# Patient Record
Sex: MALE | Race: WHITE | NOT HISPANIC OR LATINO | Employment: FULL TIME | URBAN - METROPOLITAN AREA
[De-identification: names, ages, dates, MRNs, and addresses within clinical notes are randomized per-mention and may not be internally consistent; named-entity substitution may affect disease eponyms.]

---

## 2021-01-09 ENCOUNTER — APPOINTMENT (OUTPATIENT)
Dept: GENERAL RADIOLOGY | Facility: HOSPITAL | Age: 26
End: 2021-01-09

## 2021-01-09 ENCOUNTER — HOSPITAL ENCOUNTER (EMERGENCY)
Facility: HOSPITAL | Age: 26
Discharge: HOME | End: 2021-01-09
Attending: EMERGENCY MEDICINE | Admitting: EMERGENCY MEDICINE

## 2021-01-09 VITALS
BODY MASS INDEX: 22.73 KG/M2 | RESPIRATION RATE: 18 BRPM | HEIGHT: 68 IN | SYSTOLIC BLOOD PRESSURE: 104 MMHG | HEART RATE: 117 BPM | DIASTOLIC BLOOD PRESSURE: 82 MMHG | OXYGEN SATURATION: 94 % | TEMPERATURE: 98.6 F | WEIGHT: 150 LBS

## 2021-01-09 DIAGNOSIS — J45.31 ASTHMA IN ADULT, MILD PERSISTENT, WITH ACUTE EXACERBATION: Primary | ICD-10-CM

## 2021-01-09 LAB
ALBUMIN SERPL-MCNC: 4.2 G/DL (ref 3.5–5.2)
ALBUMIN/GLOB SERPL: 1.4 G/DL
ALP SERPL-CCNC: 104 U/L (ref 39–117)
ALT SERPL W P-5'-P-CCNC: 12 U/L (ref 1–41)
ANION GAP SERPL CALCULATED.3IONS-SCNC: 7.4 MMOL/L (ref 5–15)
AST SERPL-CCNC: 19 U/L (ref 1–40)
BASOPHILS # BLD AUTO: 0.07 10*3/MM3 (ref 0–0.2)
BASOPHILS NFR BLD AUTO: 0.6 % (ref 0–1.5)
BILIRUB SERPL-MCNC: 0.7 MG/DL (ref 0–1.2)
BUN SERPL-MCNC: 12 MG/DL (ref 6–20)
BUN/CREAT SERPL: 15.8 (ref 7–25)
CALCIUM SPEC-SCNC: 9.1 MG/DL (ref 8.6–10.5)
CHLORIDE SERPL-SCNC: 96 MMOL/L (ref 98–107)
CO2 SERPL-SCNC: 30.6 MMOL/L (ref 22–29)
CREAT SERPL-MCNC: 0.76 MG/DL (ref 0.76–1.27)
DEPRECATED RDW RBC AUTO: 39.8 FL (ref 37–54)
EOSINOPHIL # BLD AUTO: 2.08 10*3/MM3 (ref 0–0.4)
EOSINOPHIL NFR BLD AUTO: 16.8 % (ref 0.3–6.2)
ERYTHROCYTE [DISTWIDTH] IN BLOOD BY AUTOMATED COUNT: 11.8 % (ref 12.3–15.4)
GFR SERPL CREATININE-BSD FRML MDRD: 126 ML/MIN/1.73
GLOBULIN UR ELPH-MCNC: 3 GM/DL
GLUCOSE SERPL-MCNC: 116 MG/DL (ref 65–99)
HCT VFR BLD AUTO: 43.1 % (ref 37.5–51)
HGB BLD-MCNC: 14.5 G/DL (ref 13–17.7)
IMM GRANULOCYTES # BLD AUTO: 0.05 10*3/MM3 (ref 0–0.05)
IMM GRANULOCYTES NFR BLD AUTO: 0.4 % (ref 0–0.5)
LYMPHOCYTES # BLD AUTO: 1.98 10*3/MM3 (ref 0.7–3.1)
LYMPHOCYTES NFR BLD AUTO: 16 % (ref 19.6–45.3)
MCH RBC QN AUTO: 30.7 PG (ref 26.6–33)
MCHC RBC AUTO-ENTMCNC: 33.6 G/DL (ref 31.5–35.7)
MCV RBC AUTO: 91.1 FL (ref 79–97)
MONOCYTES # BLD AUTO: 0.93 10*3/MM3 (ref 0.1–0.9)
MONOCYTES NFR BLD AUTO: 7.5 % (ref 5–12)
NEUTROPHILS NFR BLD AUTO: 58.7 % (ref 42.7–76)
NEUTROPHILS NFR BLD AUTO: 7.27 10*3/MM3 (ref 1.7–7)
NRBC BLD AUTO-RTO: 0 /100 WBC (ref 0–0.2)
PLATELET # BLD AUTO: 324 10*3/MM3 (ref 140–450)
PMV BLD AUTO: 9.5 FL (ref 6–12)
POTASSIUM SERPL-SCNC: 4.6 MMOL/L (ref 3.5–5.2)
PROT SERPL-MCNC: 7.2 G/DL (ref 6–8.5)
RBC # BLD AUTO: 4.73 10*6/MM3 (ref 4.14–5.8)
SODIUM SERPL-SCNC: 134 MMOL/L (ref 136–145)
WBC # BLD AUTO: 12.38 10*3/MM3 (ref 3.4–10.8)

## 2021-01-09 PROCEDURE — 99283 EMERGENCY DEPT VISIT LOW MDM: CPT

## 2021-01-09 PROCEDURE — 99284 EMERGENCY DEPT VISIT MOD MDM: CPT | Performed by: EMERGENCY MEDICINE

## 2021-01-09 PROCEDURE — 85025 COMPLETE CBC W/AUTO DIFF WBC: CPT | Performed by: EMERGENCY MEDICINE

## 2021-01-09 PROCEDURE — 94799 UNLISTED PULMONARY SVC/PX: CPT

## 2021-01-09 PROCEDURE — 25010000002 METHYLPREDNISOLONE PER 125 MG: Performed by: EMERGENCY MEDICINE

## 2021-01-09 PROCEDURE — 71045 X-RAY EXAM CHEST 1 VIEW: CPT

## 2021-01-09 PROCEDURE — 94640 AIRWAY INHALATION TREATMENT: CPT

## 2021-01-09 PROCEDURE — 96374 THER/PROPH/DIAG INJ IV PUSH: CPT

## 2021-01-09 PROCEDURE — 80053 COMPREHEN METABOLIC PANEL: CPT | Performed by: EMERGENCY MEDICINE

## 2021-01-09 RX ORDER — ALBUTEROL SULFATE 2.5 MG/3ML
2.5 SOLUTION RESPIRATORY (INHALATION)
Status: COMPLETED | OUTPATIENT
Start: 2021-01-09 | End: 2021-01-09

## 2021-01-09 RX ORDER — SODIUM CHLORIDE 0.9 % (FLUSH) 0.9 %
10 SYRINGE (ML) INJECTION AS NEEDED
Status: DISCONTINUED | OUTPATIENT
Start: 2021-01-09 | End: 2021-01-09 | Stop reason: HOSPADM

## 2021-01-09 RX ORDER — METHYLPREDNISOLONE 4 MG/1
TABLET ORAL
Qty: 21 TABLET | Refills: 0 | Status: SHIPPED | OUTPATIENT
Start: 2021-01-09

## 2021-01-09 RX ORDER — ALBUTEROL SULFATE 90 UG/1
2 AEROSOL, METERED RESPIRATORY (INHALATION) EVERY 4 HOURS PRN
Qty: 8 G | Refills: 0 | Status: SHIPPED | OUTPATIENT
Start: 2021-01-09 | End: 2021-01-23

## 2021-01-09 RX ORDER — METHYLPREDNISOLONE SODIUM SUCCINATE 125 MG/2ML
125 INJECTION, POWDER, LYOPHILIZED, FOR SOLUTION INTRAMUSCULAR; INTRAVENOUS ONCE
Status: COMPLETED | OUTPATIENT
Start: 2021-01-09 | End: 2021-01-09

## 2021-01-09 RX ORDER — ALBUTEROL SULFATE 90 UG/1
2 AEROSOL, METERED RESPIRATORY (INHALATION) EVERY 4 HOURS PRN
Status: DISCONTINUED | OUTPATIENT
Start: 2021-01-09 | End: 2021-01-09 | Stop reason: HOSPADM

## 2021-01-09 RX ADMIN — ALBUTEROL SULFATE 2.5 MG: 2.5 SOLUTION RESPIRATORY (INHALATION) at 20:02

## 2021-01-09 RX ADMIN — METHYLPREDNISOLONE SODIUM SUCCINATE 125 MG: 125 INJECTION, POWDER, FOR SOLUTION INTRAMUSCULAR; INTRAVENOUS at 19:42

## 2021-01-09 RX ADMIN — ALBUTEROL SULFATE 2.5 MG: 2.5 SOLUTION RESPIRATORY (INHALATION) at 19:42

## 2021-01-09 RX ADMIN — ALBUTEROL SULFATE 2.5 MG: 2.5 SOLUTION RESPIRATORY (INHALATION) at 19:38

## 2021-01-10 NOTE — ED PROVIDER NOTES
"Subjective     History provided by:  Patient    History of Present Illness    · Chief complaint: Shortness of breath and wheezing    · Location: Lungs    · Quality/Severity: Severe shortness of breath with wheezing.    · Timing/Onset: Started 5 days ago.    · Modifying Factors: Any exertion exacerbates his shortness of breath.    · Associated symptoms: He states he does not have a regular cough, but states he does try to purposely cough sometimes to improve his breathing.  Denies a fever, headache, change in taste or smell.    · Narrative: The patient is a 25-year-old white male complaining of a 5-day history of shortness of breath and wheezing.  He denies a previous history of asthma or wheezing.  He denies any exposures to noxious fumes.  He is a non-smoker.  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  He does not have a PCP.    Review of Systems   Constitutional: Negative for activity change, appetite change, chills, diaphoresis, fatigue and fever.   HENT: Negative for congestion, ear pain, rhinorrhea, sinus pressure, sinus pain, sore throat, trouble swallowing and voice change.    Eyes: Negative for pain and visual disturbance.   Respiratory: Positive for shortness of breath and wheezing. Negative for cough.    Cardiovascular: Negative for chest pain.   Gastrointestinal: Negative for abdominal pain, diarrhea, nausea and vomiting.   Genitourinary: Negative for difficulty urinating.   Musculoskeletal: Negative for back pain, myalgias, neck pain and neck stiffness.   Skin: Negative for color change and rash.   Neurological: Negative for dizziness, weakness, numbness and headaches.   Psychiatric/Behavioral: Negative for confusion.     History reviewed. No pertinent past medical history.  /82   Pulse 117   Temp 98.6 °F (37 °C) (Oral)   Resp 18   Ht 172.7 cm (68\")   Wt 68 kg (150 lb)   SpO2 94%   BMI 22.81 kg/m²     History reviewed. No pertinent past medical history.  Labs Reviewed   COMPREHENSIVE METABOLIC PANEL " "- Abnormal; Notable for the following components:       Result Value    Glucose 116 (*)     Sodium 134 (*)     Chloride 96 (*)     CO2 30.6 (*)     All other components within normal limits    Narrative:     GFR Normal >60  Chronic Kidney Disease <60  Kidney Failure <15     CBC WITH AUTO DIFFERENTIAL - Abnormal; Notable for the following components:    WBC 12.38 (*)     RDW 11.8 (*)     Lymphocyte % 16.0 (*)     Eosinophil % 16.8 (*)     Neutrophils, Absolute 7.27 (*)     Monocytes, Absolute 0.93 (*)     Eosinophils, Absolute 2.08 (*)     All other components within normal limits   CBC AND DIFFERENTIAL    Narrative:     The following orders were created for panel order CBC & Differential.  Procedure                               Abnormality         Status                     ---------                               -----------         ------                     CBC Auto Differential[[id number]]        Abnormal            Final result                 Please view results for these tests on the individual orders.       Allergies   Allergen Reactions   • Sulfuric Acid Other (See Comments)     \"I was just told that a long time ago with eye drops\"        History reviewed. No pertinent surgical history.    History reviewed. No pertinent family history.    Social History     Socioeconomic History   • Marital status: Single     Spouse name: Not on file   • Number of children: Not on file   • Years of education: Not on file   • Highest education level: Not on file   Tobacco Use   • Smoking status: Never Smoker   Substance and Sexual Activity   • Alcohol use: Never     Frequency: Never   • Drug use: Never           Objective   Physical Exam  Vitals signs and nursing note reviewed.   Constitutional:       General: He is in acute distress.      Appearance: He is well-developed. He is not ill-appearing, toxic-appearing or diaphoretic.      Comments: The patient appears in acute respiratory distress.  He does not appear toxic.  " Review of his vital signs: He is afebrile with a temperature 98.6, tachypneic with a respiratory rate of 28 with a normal room air oxygen saturation of 98%, tachycardic with a heart rate of 108, blood pressure slightly elevated 147/90.   HENT:      Head: Normocephalic and atraumatic.   Eyes:      Extraocular Movements: Extraocular movements intact.      Pupils: Pupils are equal, round, and reactive to light.   Neck:      Musculoskeletal: Normal range of motion and neck supple.   Cardiovascular:      Rate and Rhythm: Regular rhythm. Tachycardia present.      Heart sounds: No murmur.   Pulmonary:      Effort: Tachypnea present.      Breath sounds: Examination of the right-upper field reveals decreased breath sounds and wheezing. Examination of the left-upper field reveals decreased breath sounds and wheezing. Examination of the right-middle field reveals decreased breath sounds and wheezing. Examination of the left-middle field reveals decreased breath sounds and wheezing. Examination of the right-lower field reveals decreased breath sounds and wheezing. Examination of the left-lower field reveals decreased breath sounds and wheezing. Decreased breath sounds and wheezing present. No rhonchi or rales.   Chest:      Chest wall: No mass.   Abdominal:      General: Bowel sounds are normal.      Palpations: Abdomen is soft.      Tenderness: There is no abdominal tenderness. There is no guarding.   Musculoskeletal: Normal range of motion.      Right lower leg: He exhibits no tenderness. No edema.      Left lower leg: He exhibits no tenderness. No edema.   Skin:     General: Skin is warm and dry.      Capillary Refill: Capillary refill takes less than 2 seconds.      Coloration: Skin is not cyanotic.      Findings: No erythema or rash.   Neurological:      General: No focal deficit present.      Mental Status: He is alert and oriented to person, place, and time.      Cranial Nerves: No cranial nerve deficit.      Motor: No  weakness.   Psychiatric:         Mood and Affect: Mood is anxious.         Procedures           ED Course  ED Course as of Jan 09 2251   Sat Jan 09, 2021 2055 The patient's chest x-ray showed no acute infiltrate.  The radiologist noted an abnormal fullness of the hilum bilaterally and a possible upper lobe nodule.    [TP]   2056 Review the patient's laboratory studies: His CBC had an elevated white count of 12 with a normal differential.  CMP was essentially normal.    [TP]   2056 The patient had moderate respiratory distress on arrival with full phase expiratory wheezing and end inspiratory phase wheezing and decreased air movement.  He was tachypneic with a respiratory rate of 28 and a normal oxygen saturation of 98% on room air.  He was administered 3 albuterol nebulizer treatments and Solu-Medrol 125 mg IV.    [TP]   2105 The patient was examined at 20:20 and noticed to have improved air movement with clearing of the inspiratory phase and persistent expiratory wheezing.  The patient subjectively stated his dyspnea was much better.    [TP]   2106 Reexamination at 21:00 the patient had significant improvement in his dyspnea with a room air oxygen saturation of 95% with persistent expiratory wheezing with good air movement.  The patient request to go home.    [TP]   2250 The patient was discharged with prescriptions for an albuterol inhaler and a Medrol Dosepak.  He was referred to follow-up with Dr. Chisholm.    [TP]      ED Course User Index  [TP] Osman Webb MD                                           MDM  Number of Diagnoses or Management Options  Asthma in adult, mild persistent, with acute exacerbation: new and requires workup     Amount and/or Complexity of Data Reviewed  Clinical lab tests: ordered and reviewed  Tests in the radiology section of CPT®: ordered and reviewed    Risk of Complications, Morbidity, and/or Mortality  Presenting problems: high  Diagnostic procedures: high  Management  options: high  General comments: My differential diagnosis for dyspnea includes but is not limited to:  Asthma, COPD, pneumonia, pulmonary embolus, acute respiratory distress syndrome, pneumothorax, pleural effusion, pulmonary fibrosis, congestive heart failure, myocardial infarction, DKA, uremia, acidosis, sepsis, anemia, drug related, hyperventilation, CNS disease    Patient Progress  Patient progress: improved      Final diagnoses:   Asthma in adult, mild persistent, with acute exacerbation           Labs Reviewed   COMPREHENSIVE METABOLIC PANEL - Abnormal; Notable for the following components:       Result Value    Glucose 116 (*)     Sodium 134 (*)     Chloride 96 (*)     CO2 30.6 (*)     All other components within normal limits    Narrative:     GFR Normal >60  Chronic Kidney Disease <60  Kidney Failure <15     CBC WITH AUTO DIFFERENTIAL - Abnormal; Notable for the following components:    WBC 12.38 (*)     RDW 11.8 (*)     Lymphocyte % 16.0 (*)     Eosinophil % 16.8 (*)     Neutrophils, Absolute 7.27 (*)     Monocytes, Absolute 0.93 (*)     Eosinophils, Absolute 2.08 (*)     All other components within normal limits   CBC AND DIFFERENTIAL    Narrative:     The following orders were created for panel order CBC & Differential.  Procedure                               Abnormality         Status                     ---------                               -----------         ------                     CBC Auto Differential[[id number]]        Abnormal            Final result                 Please view results for these tests on the individual orders.     XR Chest 1 View   Final Result      1. Possible nodule right upper lobe about 8 mm dimension. Recommend correlation with a chest CT.   2. Abnormal fullness of the bilateral carleen with thickening of the central bronchovascular soft tissues and more peripheral interstitial prominence. This could be acute or chronic in the absence of prior films.   3. No evidence for  acute congestive failure.      Signer Name: Janae Rojas MD    Signed: 1/9/2021 8:44 PM    Workstation Name: [email or web address]     [facility]             Medication List      New Prescriptions    albuterol sulfate  (90 Base) MCG/ACT inhaler  Commonly known as: PROVENTIL HFA;VENTOLIN HFA;PROAIR HFA  Inhale 2 puffs Every 4 (Four) Hours As Needed for Wheezing or Shortness of Air for up to 14 days.     methylPREDNISolone 4 MG dose pack  Commonly known as: MEDROL  follow package directions           Where to Get Your Medications      These medications were sent to [place] [address] AT [facility] [address] [facility] - [phone] [facility] [phone] [place] [address]    Phone: [phone]   · albuterol sulfate  (90 Base) MCG/ACT inhaler  · methylPREDNISolone 4 MG dose pack              Osman Webb MD  01/09/21 4509

## 2024-03-17 ENCOUNTER — HOSPITAL ENCOUNTER (EMERGENCY)
Facility: HOSPITAL | Age: 29
Discharge: HOME | End: 2024-03-17
Attending: EMERGENCY MEDICINE | Admitting: EMERGENCY MEDICINE
Payer: MEDICAID

## 2024-03-17 VITALS
RESPIRATION RATE: 18 BRPM | HEIGHT: 68 IN | TEMPERATURE: 97.9 F | BODY MASS INDEX: 26.52 KG/M2 | HEART RATE: 61 BPM | WEIGHT: 175 LBS | DIASTOLIC BLOOD PRESSURE: 75 MMHG | SYSTOLIC BLOOD PRESSURE: 129 MMHG | OXYGEN SATURATION: 100 %

## 2024-03-17 DIAGNOSIS — F19.10 IV DRUG ABUSE: Primary | ICD-10-CM

## 2024-03-17 DIAGNOSIS — L98.491 CHRONIC SKIN ULCER, LIMITED TO BREAKDOWN OF SKIN: ICD-10-CM

## 2024-03-17 LAB
AMPHET+METHAMPHET UR QL: NEGATIVE
AMPHETAMINES UR QL: NEGATIVE
ANION GAP SERPL CALCULATED.3IONS-SCNC: 10 MMOL/L (ref 5–15)
BARBITURATES UR QL SCN: NEGATIVE
BASOPHILS # BLD AUTO: 0.06 10*3/MM3 (ref 0–0.2)
BASOPHILS NFR BLD AUTO: 0.7 % (ref 0–1.5)
BENZODIAZ UR QL SCN: NEGATIVE
BILIRUB UR QL STRIP: NEGATIVE
BUN SERPL-MCNC: 16 MG/DL (ref 6–20)
BUN/CREAT SERPL: 17.6 (ref 7–25)
BUPRENORPHINE SERPL-MCNC: NEGATIVE NG/ML
CALCIUM SPEC-SCNC: 9 MG/DL (ref 8.6–10.5)
CANNABINOIDS SERPL QL: POSITIVE
CHLORIDE SERPL-SCNC: 102 MMOL/L (ref 98–107)
CLARITY UR: CLEAR
CO2 SERPL-SCNC: 25 MMOL/L (ref 22–29)
COCAINE UR QL: NEGATIVE
COLOR UR: YELLOW
CREAT SERPL-MCNC: 0.91 MG/DL (ref 0.76–1.27)
DEPRECATED RDW RBC AUTO: 39.2 FL (ref 37–54)
EGFRCR SERPLBLD CKD-EPI 2021: 118.5 ML/MIN/1.73
EOSINOPHIL # BLD AUTO: 0.53 10*3/MM3 (ref 0–0.4)
EOSINOPHIL NFR BLD AUTO: 6 % (ref 0.3–6.2)
ERYTHROCYTE [DISTWIDTH] IN BLOOD BY AUTOMATED COUNT: 12.9 % (ref 12.3–15.4)
GLUCOSE SERPL-MCNC: 133 MG/DL (ref 65–99)
GLUCOSE UR STRIP-MCNC: NEGATIVE MG/DL
HCT VFR BLD AUTO: 41.3 % (ref 37.5–51)
HGB BLD-MCNC: 13.7 G/DL (ref 13–17.7)
HGB UR QL STRIP.AUTO: NEGATIVE
IMM GRANULOCYTES # BLD AUTO: 0.07 10*3/MM3 (ref 0–0.05)
IMM GRANULOCYTES NFR BLD AUTO: 0.8 % (ref 0–0.5)
KETONES UR QL STRIP: NEGATIVE
LEUKOCYTE ESTERASE UR QL STRIP.AUTO: NEGATIVE
LYMPHOCYTES # BLD AUTO: 2.09 10*3/MM3 (ref 0.7–3.1)
LYMPHOCYTES NFR BLD AUTO: 23.5 % (ref 19.6–45.3)
MCH RBC QN AUTO: 27.8 PG (ref 26.6–33)
MCHC RBC AUTO-ENTMCNC: 33.2 G/DL (ref 31.5–35.7)
MCV RBC AUTO: 83.8 FL (ref 79–97)
METHADONE UR QL SCN: NEGATIVE
MONOCYTES # BLD AUTO: 0.55 10*3/MM3 (ref 0.1–0.9)
MONOCYTES NFR BLD AUTO: 6.2 % (ref 5–12)
NEUTROPHILS NFR BLD AUTO: 5.58 10*3/MM3 (ref 1.7–7)
NEUTROPHILS NFR BLD AUTO: 62.8 % (ref 42.7–76)
NITRITE UR QL STRIP: NEGATIVE
NRBC BLD AUTO-RTO: 0 /100 WBC (ref 0–0.2)
OPIATES UR QL: NEGATIVE
OXYCODONE UR QL SCN: NEGATIVE
PCP UR QL SCN: NEGATIVE
PH UR STRIP.AUTO: 6.5 [PH] (ref 4.5–8)
PLATELET # BLD AUTO: 402 10*3/MM3 (ref 140–450)
PMV BLD AUTO: 8.9 FL (ref 6–12)
POTASSIUM SERPL-SCNC: 4.5 MMOL/L (ref 3.5–5.2)
PROT UR QL STRIP: NEGATIVE
RBC # BLD AUTO: 4.93 10*6/MM3 (ref 4.14–5.8)
SODIUM SERPL-SCNC: 137 MMOL/L (ref 136–145)
SP GR UR STRIP: 1.02 (ref 1–1.03)
TRICYCLICS UR QL SCN: NEGATIVE
UROBILINOGEN UR QL STRIP: NORMAL
WBC NRBC COR # BLD AUTO: 8.88 10*3/MM3 (ref 3.4–10.8)

## 2024-03-17 PROCEDURE — 81003 URINALYSIS AUTO W/O SCOPE: CPT | Performed by: EMERGENCY MEDICINE

## 2024-03-17 PROCEDURE — 99283 EMERGENCY DEPT VISIT LOW MDM: CPT

## 2024-03-17 PROCEDURE — 90471 IMMUNIZATION ADMIN: CPT | Performed by: EMERGENCY MEDICINE

## 2024-03-17 PROCEDURE — 80306 DRUG TEST PRSMV INSTRMNT: CPT | Performed by: EMERGENCY MEDICINE

## 2024-03-17 PROCEDURE — 36415 COLL VENOUS BLD VENIPUNCTURE: CPT

## 2024-03-17 PROCEDURE — 90715 TDAP VACCINE 7 YRS/> IM: CPT | Performed by: EMERGENCY MEDICINE

## 2024-03-17 PROCEDURE — 80048 BASIC METABOLIC PNL TOTAL CA: CPT | Performed by: EMERGENCY MEDICINE

## 2024-03-17 PROCEDURE — 25010000002 TETANUS-DIPHTH-ACELL PERTUSSIS 5-2.5-18.5 LF-MCG/0.5 SUSPENSION PREFILLED SYRINGE: Performed by: EMERGENCY MEDICINE

## 2024-03-17 PROCEDURE — 85025 COMPLETE CBC W/AUTO DIFF WBC: CPT | Performed by: EMERGENCY MEDICINE

## 2024-03-17 RX ORDER — CLINDAMYCIN HYDROCHLORIDE 300 MG/1
300 CAPSULE ORAL 3 TIMES DAILY
Qty: 21 CAPSULE | Refills: 0 | Status: SHIPPED | OUTPATIENT
Start: 2024-03-17 | End: 2024-03-24

## 2024-03-17 RX ORDER — CLINDAMYCIN HYDROCHLORIDE 150 MG/1
300 CAPSULE ORAL ONCE
Status: COMPLETED | OUTPATIENT
Start: 2024-03-17 | End: 2024-03-17

## 2024-03-17 RX ADMIN — TETANUS TOXOID, REDUCED DIPHTHERIA TOXOID AND ACELLULAR PERTUSSIS VACCINE, ADSORBED 0.5 ML: 5; 2.5; 8; 8; 2.5 SUSPENSION INTRAMUSCULAR at 15:53

## 2024-03-17 RX ADMIN — CLINDAMYCIN HYDROCHLORIDE 300 MG: 150 CAPSULE ORAL at 15:53

## 2024-03-17 NOTE — ED PROVIDER NOTES
"Subjective   History of Present Illness  28-year-old male presents emergency room complaining of wound check.  Patient states that he is a longtime IV drug user and that he thinks that for the last month that he has gotten into a bad batch of fentanyl that he uses.  He states that his bilateral forearms have become slowly worse during the same time period.  Patient reports no fever shortness of breath chest pain anorexia nausea or vomiting.      Review of Systems   Constitutional:  Negative for activity change, appetite change, chills, diaphoresis, fatigue and fever.   HENT:  Negative for congestion, sinus pressure, sneezing and sore throat.    Eyes:  Negative for photophobia and visual disturbance.   Respiratory:  Negative for cough and shortness of breath.    Cardiovascular:  Negative for chest pain, palpitations and leg swelling.   Gastrointestinal:  Negative for abdominal distention, abdominal pain, diarrhea, nausea and vomiting.   Genitourinary:  Negative for dysuria and flank pain.   Musculoskeletal:  Negative for arthralgias, back pain and myalgias.   Skin:  Positive for wound. Negative for rash.   Neurological:  Negative for dizziness, weakness and headaches.   Psychiatric/Behavioral:  Negative for behavioral problems and confusion.        Past Medical History:   Diagnosis Date    Hypertension        Allergies   Allergen Reactions    Sulfuric Acid Other (See Comments)     \"I was just told that a long time ago with eye drops\"        History reviewed. No pertinent surgical history.    History reviewed. No pertinent family history.    Social History     Socioeconomic History    Marital status: Single   Tobacco Use    Smoking status: Never   Substance and Sexual Activity    Alcohol use: Never    Drug use: Yes     Types: IV     Comment: Fentanyl    Sexual activity: Defer           Objective   Physical Exam  Vitals and nursing note reviewed.   Constitutional:       General: He is not in acute distress.     " Appearance: Normal appearance. He is not toxic-appearing or diaphoretic.      Comments: Pleasant 28-year-old male in no acute distress   HENT:      Head: Normocephalic and atraumatic.      Mouth/Throat:      Mouth: Mucous membranes are moist.   Eyes:      Conjunctiva/sclera: Conjunctivae normal.   Cardiovascular:      Rate and Rhythm: Normal rate and regular rhythm.      Pulses: Normal pulses.   Pulmonary:      Effort: Pulmonary effort is normal. No respiratory distress.      Breath sounds: Normal breath sounds.   Abdominal:      General: Abdomen is flat.   Musculoskeletal:         General: Normal range of motion.        Arms:       Cervical back: Normal range of motion and neck supple.      Comments: Patient has bilateral chronic wounds on his lateral forearms as seen on diagram's.  Each site is approximately 8 to 10 cm x 3 to 4 cm.  There is some mild chronic swelling with skin thickening in the same area.  There is skin breakdown and scabbing at both sites however no significant erythema or purulent drainage noted.  Patient's upper extremities past the wound sites show no significant swelling, decreased pulses decreased strength or  strength and sensation is intact.  Bilateral capillary refill is 2 to 3 seconds.   Skin:     General: Skin is warm and dry.   Neurological:      General: No focal deficit present.      Mental Status: He is alert and oriented to person, place, and time.   Psychiatric:         Mood and Affect: Mood normal.         Behavior: Behavior normal.         Procedures           ED Course  ED Course as of 03/17/24 1813   Sun Mar 17, 2024   1530 Spoke with patient concerning his chronic IV drug use causing his chronic wounds and the need for at least reduction in the amount he is using and ultimate hope that he stopping IV drug use.  Patient states he has stopped several times but that his problem is he is allergic to Suboxone so he cannot stop.  Patient voiced no interest in receiving help at  this time for his IV drug abuse only concern for wound check.  Patient was very pleasant during this conversation. [BH]   1600 When nurse went back in to reassess patient he was tearful and stated that after thinking about things he actually does want help.  Patient stated that he would be interested in speaking with with the [facility].  We will obtain medical clearance labs and then paged the [facility] for evaluation. [BH]   1602 Glucose(!): 133 [BH]   1602 BUN: 16 [BH]   1602 Creatinine: 0.91 [BH]   1602 Sodium: 137 [BH]   1602 Potassium: 4.5 [BH]   1602 Chloride: 102 [BH]   1602 CO2: 25.0 [BH]   1602 Calcium: 9.0 [BH]   1602 WBC: 8.88 [BH]   1602 Hemoglobin: 13.7 [BH]   1602 Hematocrit: 41.3 [BH]   1602 Platelets: 402 [BH]   1644 Glucose: Negative [BH]   1644 Ketones, UA: Negative []   1644 Bilirubin, UA: Negative [BH]   1644 Blood, UA: Negative [BH]   1644 Protein, UA: Negative [BH]   1644 Leukocytes, UA: Negative [BH]   1644 Nitrite, UA: Negative [BH]   1651 THC Screen, Urine(!): Positive [BH]   1651 Phencyclidine (PCP), Urine: Negative [BH]   1651 Cocaine Screen, Urine: Negative [BH]   1651 Methamphetamine, Ur: Negative [BH]   1651 Opiate Screen: Negative [BH]   1651 Amphetamine, Urine Qual: Negative [BH]   1651 Benzodiazepine Screen, Urine: Negative []   1651 Tricyclic Antidepressants Screen: Negative [BH]   1651 Methadone Screen , Urine: Negative [BH]   1651 Barbiturates Screen, Urine: Negative [BH]   1651 Oxycodone Screen, Urine: Negative [BH]   1651 Buprenorphine, Screen, Urine: Negative  Patient drug screen is only positive for THC however he states that he shoots up fentanyl which will not positive on a routine urine drug screen []   1652 We will now page the [facility] [BH]   1801 I spoke with Mildred from the [facility] who after evaluating the patient and speaking with her psychiatrist Dr. Faria initially wanted the patient to undergo an patient drug rehabilitation however patient refused saying that he has to  work and take care of his father.  Thus they recommend the evening program at [facility] and are faxing the information over to us for the patient to have.  Patient states he understands agrees with plan of discharge home with outpatient follow-up.  Patient can also return the emergency room for new persistent or worsening symptoms. []   1811 Spoke with patient concerning his and my discussion with the [facility].  Patient or stands he is to follow-up outpatiently with the evening program.  Patient can also return the emergency room for new persistent or worsening symptoms.  Patient states he has no other concerns or questions.  Patient states he understands agrees with plan. []      ED Course User Index  [] Karson Carpio MD                                             Medical Decision Making  My differential diagnosis of the upper extremity pain or injury includes but is not limited to contusions of the shoulder, forearm, arm, wrist, elbow or hand, dislocations of shoulder, elbow, wrist, digits, nursemaid's elbow (age-appropriate), shoulder sprain, elbow sprain, wrist sprain, digit sprain, shoulder strain, arm strain, forearm strain, elbow strain, wrist strain, hand sprain, digit strain, lacerations of the upper extremity, fractures both closed and open of clavicle, scapula, humerus, radius, ulna, bones of the wrist, hands and digits, cellulitis or abscess, cervical radiculopathy, radial nerve palsy, neurogenic upper extremity pain, angina equivalent, SVT, DVT, arterial occlusion, compartment syndrome.     Amount and/or Complexity of Data Reviewed  Labs: ordered. Decision-making details documented in ED Course.    Risk  Prescription drug management.        Final diagnoses:   IV drug abuse   Chronic skin ulcer, limited to breakdown of skin       ED Disposition  ED Disposition       ED Disposition   Discharge    Condition   Stable    Comment   --               PATIENT CONNECTION - [place]  [place] [address]  [phone]  Call       [facility] EMERGENCY DEPARTMENT  [address]  [place] [address] [phone]  Go to   As needed, If symptoms worsen         Medication List        New Prescriptions      clindamycin 300 MG capsule  Commonly known as: CLEOCIN  Take 1 capsule by mouth 3 (Three) Times a Day for 7 days.               Where to Get Your Medications        These medications were sent to [facility] [id number] - [place], [place] - [address] [facility] [address] [facility] [address] [phone] [facility] [phone]   [address], [facility] [address]      Phone: [phone]   clindamycin 300 MG capsule            Karson Carpio MD  03/17/24 6062

## 2024-03-17 NOTE — DISCHARGE INSTRUCTIONS
Patient is to be handed information that was faxed in the [facility] for outpatient substance use disorder treatment.